# Patient Record
Sex: FEMALE | Race: WHITE | NOT HISPANIC OR LATINO | Employment: OTHER | URBAN - METROPOLITAN AREA
[De-identification: names, ages, dates, MRNs, and addresses within clinical notes are randomized per-mention and may not be internally consistent; named-entity substitution may affect disease eponyms.]

---

## 2019-03-12 ENCOUNTER — OFFICE VISIT (OUTPATIENT)
Dept: PHYSICAL MEDICINE AND REHAB | Facility: CLINIC | Age: 74
End: 2019-03-12
Payer: MEDICARE

## 2019-03-12 VITALS
DIASTOLIC BLOOD PRESSURE: 74 MMHG | WEIGHT: 212 LBS | SYSTOLIC BLOOD PRESSURE: 132 MMHG | RESPIRATION RATE: 18 BRPM | HEART RATE: 81 BPM | HEIGHT: 61 IN | BODY MASS INDEX: 40.02 KG/M2 | OXYGEN SATURATION: 96 % | TEMPERATURE: 97 F

## 2019-03-12 DIAGNOSIS — M47.16 LUMBAR SPONDYLOSIS WITH MYELOPATHY: Primary | ICD-10-CM

## 2019-03-12 DIAGNOSIS — M47.819 UNDIFFERENTIATED SPONDYLOARTHROPATHY: ICD-10-CM

## 2019-03-12 DIAGNOSIS — M54.5 CHRONIC MIDLINE LOW BACK PAIN, WITH SCIATICA PRESENCE UNSPECIFIED: ICD-10-CM

## 2019-03-12 DIAGNOSIS — M75.51 BILATERAL SHOULDER BURSITIS: ICD-10-CM

## 2019-03-12 DIAGNOSIS — J01.00 ACUTE NON-RECURRENT MAXILLARY SINUSITIS: ICD-10-CM

## 2019-03-12 DIAGNOSIS — G89.29 CHRONIC MIDLINE LOW BACK PAIN, WITH SCIATICA PRESENCE UNSPECIFIED: ICD-10-CM

## 2019-03-12 DIAGNOSIS — M54.12 CERVICAL RADICULOPATHY: ICD-10-CM

## 2019-03-12 DIAGNOSIS — M54.17 LUMBOSACRAL RADICULOPATHY: ICD-10-CM

## 2019-03-12 DIAGNOSIS — M75.52 BILATERAL SHOULDER BURSITIS: ICD-10-CM

## 2019-03-12 DIAGNOSIS — E66.01 CLASS 3 SEVERE OBESITY DUE TO EXCESS CALORIES WITH SERIOUS COMORBIDITY AND BODY MASS INDEX (BMI) OF 40.0 TO 44.9 IN ADULT: ICD-10-CM

## 2019-03-12 DIAGNOSIS — M70.61 TROCHANTERIC BURSITIS OF RIGHT HIP: ICD-10-CM

## 2019-03-12 PROBLEM — M47.9 SPONDYLOSIS: Status: ACTIVE | Noted: 2019-03-12

## 2019-03-12 PROBLEM — M81.0 OSTEOPOROSIS: Status: ACTIVE | Noted: 2019-03-12

## 2019-03-12 PROBLEM — H34.10 CENTRAL RETINAL ARTERY OCCLUSION: Status: ACTIVE | Noted: 2019-03-12

## 2019-03-12 PROBLEM — G56.00 CARPAL TUNNEL SYNDROME: Status: ACTIVE | Noted: 2019-03-12

## 2019-03-12 PROBLEM — I73.9 PERIPHERAL VASCULAR DISEASE: Status: ACTIVE | Noted: 2019-03-12

## 2019-03-12 PROBLEM — M31.6 GIANT CELL ARTERITIS: Status: ACTIVE | Noted: 2019-03-12

## 2019-03-12 PROBLEM — M35.00 SJOGREN'S SYNDROME: Status: ACTIVE | Noted: 2019-03-12

## 2019-03-12 PROBLEM — M79.7 FIBROSITIS: Status: ACTIVE | Noted: 2019-03-12

## 2019-03-12 PROBLEM — R94.4 ABNORMAL CREATININE CLEARANCE GLOMERULAR FILTRATION: Status: ACTIVE | Noted: 2018-12-20

## 2019-03-12 PROBLEM — J01.90 ACUTE SINUSITIS: Status: ACTIVE | Noted: 2019-03-12

## 2019-03-12 PROBLEM — K94.13 COLOSTOMY AND ENTEROSTOMY MALFUNCTION: Status: ACTIVE | Noted: 2019-03-12

## 2019-03-12 PROBLEM — M19.049 DEGENERATIVE JOINT DISEASE OF HAND: Status: ACTIVE | Noted: 2019-03-12

## 2019-03-12 PROBLEM — R53.81 MALAISE AND FATIGUE: Status: ACTIVE | Noted: 2019-03-12

## 2019-03-12 PROBLEM — M17.9 OSTEOARTHRITIS OF KNEE: Status: ACTIVE | Noted: 2019-03-12

## 2019-03-12 PROBLEM — E79.0 HYPERURICEMIA: Status: ACTIVE | Noted: 2019-03-12

## 2019-03-12 PROBLEM — N39.0 URINARY TRACT INFECTIOUS DISEASE: Status: ACTIVE | Noted: 2019-03-12

## 2019-03-12 PROBLEM — M75.50 BURSITIS OF SHOULDER: Status: ACTIVE | Noted: 2019-03-12

## 2019-03-12 PROBLEM — G43.909 MIGRAINE: Status: ACTIVE | Noted: 2019-03-12

## 2019-03-12 PROBLEM — R11.0 NAUSEA: Status: ACTIVE | Noted: 2019-03-12

## 2019-03-12 PROBLEM — R53.83 MALAISE AND FATIGUE: Status: ACTIVE | Noted: 2019-03-12

## 2019-03-12 PROBLEM — E78.2 MIXED HYPERLIPIDEMIA: Status: ACTIVE | Noted: 2019-03-12

## 2019-03-12 PROBLEM — B37.2 CANDIDIASIS OF SKIN AND NAILS: Status: ACTIVE | Noted: 2019-03-12

## 2019-03-12 PROBLEM — M62.81 MUSCLE WEAKNESS: Status: ACTIVE | Noted: 2019-03-12

## 2019-03-12 PROBLEM — M70.60 TROCHANTERIC BURSITIS: Status: ACTIVE | Noted: 2019-03-12

## 2019-03-12 PROBLEM — J30.9 ALLERGIC RHINITIS: Status: ACTIVE | Noted: 2019-03-12

## 2019-03-12 PROBLEM — R10.9 ABDOMINAL PAIN: Status: ACTIVE | Noted: 2019-03-12

## 2019-03-12 PROBLEM — K94.03 COLOSTOMY AND ENTEROSTOMY MALFUNCTION: Status: ACTIVE | Noted: 2019-03-12

## 2019-03-12 PROBLEM — F41.9 ANXIETY DISORDER: Status: ACTIVE | Noted: 2019-03-12

## 2019-03-12 PROBLEM — I60.7 RUPTURED CEREBRAL ANEURYSM: Status: ACTIVE | Noted: 2017-09-18

## 2019-03-12 PROBLEM — I10 BENIGN ESSENTIAL HYPERTENSION: Status: ACTIVE | Noted: 2019-03-12

## 2019-03-12 PROBLEM — D50.0 ANEMIA DUE TO BLOOD LOSS: Status: ACTIVE | Noted: 2019-03-12

## 2019-03-12 PROBLEM — R51.9 HEADACHE: Status: ACTIVE | Noted: 2019-03-12

## 2019-03-12 PROBLEM — K21.9 GASTROESOPHAGEAL REFLUX DISEASE: Status: ACTIVE | Noted: 2019-03-12

## 2019-03-12 PROCEDURE — 99214 OFFICE O/P EST MOD 30 MIN: CPT | Mod: S$GLB,,, | Performed by: NURSE PRACTITIONER

## 2019-03-12 PROCEDURE — 99214 PR OFFICE/OUTPT VISIT, EST, LEVL IV, 30-39 MIN: ICD-10-PCS | Mod: S$GLB,,, | Performed by: NURSE PRACTITIONER

## 2019-03-12 RX ORDER — DICLOFENAC SODIUM 10 MG/G
GEL TOPICAL 3 TIMES DAILY PRN
Qty: 100 G | Refills: 2 | Status: SHIPPED | OUTPATIENT
Start: 2019-03-12 | End: 2019-06-12

## 2019-03-12 RX ORDER — CYCLOBENZAPRINE HCL 10 MG
1 TABLET ORAL 3 TIMES DAILY PRN
COMMUNITY

## 2019-03-12 RX ORDER — PROMETHAZINE HYDROCHLORIDE 25 MG/1
1 TABLET ORAL 2 TIMES DAILY PRN
COMMUNITY

## 2019-03-12 RX ORDER — LINAGLIPTIN 5 MG/1
1 TABLET, FILM COATED ORAL DAILY
COMMUNITY
Start: 2019-01-26

## 2019-03-12 RX ORDER — HYDROCODONE BITARTRATE AND ACETAMINOPHEN 5; 325 MG/1; MG/1
1 TABLET ORAL 2 TIMES DAILY PRN
COMMUNITY

## 2019-03-12 RX ORDER — ATORVASTATIN CALCIUM 40 MG/1
1 TABLET, FILM COATED ORAL DAILY
COMMUNITY
Start: 2019-01-16

## 2019-03-12 RX ORDER — CYCLOSPORINE 0.5 MG/ML
1 EMULSION OPHTHALMIC 2 TIMES DAILY
COMMUNITY

## 2019-03-12 RX ORDER — OXAZEPAM 15 MG/1
1 CAPSULE ORAL 2 TIMES DAILY PRN
COMMUNITY
Start: 2019-01-29

## 2019-03-12 RX ORDER — FLUTICASONE PROPIONATE 50 MCG
1 SPRAY, SUSPENSION (ML) NASAL DAILY PRN
COMMUNITY

## 2019-03-12 RX ORDER — DICLOFENAC SODIUM 10 MG/G
1 GEL TOPICAL 3 TIMES DAILY PRN
COMMUNITY
End: 2019-03-12 | Stop reason: SDUPTHER

## 2019-03-12 RX ORDER — GLIPIZIDE 5 MG/1
1 TABLET ORAL 2 TIMES DAILY
COMMUNITY

## 2019-03-12 RX ORDER — PANTOPRAZOLE SODIUM 40 MG/1
1 TABLET, DELAYED RELEASE ORAL DAILY
COMMUNITY
Start: 2019-03-10

## 2019-03-12 RX ORDER — ALLOPURINOL 100 MG/1
1 TABLET ORAL 2 TIMES DAILY
COMMUNITY
End: 2019-04-30 | Stop reason: SDUPTHER

## 2019-03-12 RX ORDER — BUTALBITAL, ACETAMINOPHEN AND CAFFEINE 300; 40; 50 MG/1; MG/1; MG/1
2 CAPSULE ORAL 2 TIMES DAILY PRN
COMMUNITY

## 2019-03-12 RX ORDER — FUROSEMIDE 40 MG/1
1 TABLET ORAL DAILY
COMMUNITY
Start: 2019-01-16

## 2019-03-12 RX ORDER — LIDOCAINE 50 MG/G
1 PATCH TOPICAL 2 TIMES DAILY PRN
COMMUNITY

## 2019-03-12 RX ORDER — BISOPROLOL FUMARATE AND HYDROCHLOROTHIAZIDE 5; 6.25 MG/1; MG/1
1 TABLET ORAL DAILY
COMMUNITY

## 2019-03-12 RX ORDER — GABAPENTIN 300 MG/1
300 CAPSULE ORAL 3 TIMES DAILY
Qty: 90 CAPSULE | Refills: 2 | Status: SHIPPED | OUTPATIENT
Start: 2019-03-12

## 2019-03-12 RX ORDER — AMOXICILLIN AND CLAVULANATE POTASSIUM 875; 125 MG/1; MG/1
1 TABLET, FILM COATED ORAL 2 TIMES DAILY
Qty: 20 TABLET | Refills: 0 | Status: SHIPPED | OUTPATIENT
Start: 2019-03-12 | End: 2019-06-12 | Stop reason: SDUPTHER

## 2019-03-12 RX ORDER — PREDNISONE 10 MG/1
1 TABLET ORAL 2 TIMES DAILY
COMMUNITY
End: 2019-04-30 | Stop reason: SDUPTHER

## 2019-03-12 RX ORDER — GABAPENTIN 300 MG/1
1 CAPSULE ORAL 3 TIMES DAILY
COMMUNITY
Start: 2019-02-27 | End: 2019-03-12 | Stop reason: SDUPTHER

## 2019-03-12 RX ORDER — CLOPIDOGREL BISULFATE 75 MG/1
1 TABLET ORAL DAILY
COMMUNITY
Start: 2019-03-10

## 2019-03-12 NOTE — PROGRESS NOTES
Subjective:       Patient ID: Ramona Patiño is a 73 y.o. female.    Chief Complaint: Other (Chronic pain)    HPI     Joint & Soft Tissue Pain  Reported by patient.  Location: right (hip); deep; joint pain   Quality: aching; throbbing; deep; trend is not changing   Timing: frequent   Severity: moderate; pain level 6/10  Duration: 2 years   Context: bending; overuse   Alleviating Factors: position change; rest   Aggravating Factors: sitting; standing; ROM  Associated Symptoms: no swelling; no redness; no warmth; no ecchymosis; no catching/locking; no buckling; no grinding; weakness; numbness; tingling; popping/clicking    Lower Back  Reported by patient.  Location: bilateral; midline   Quality: aching; throbbing; sharp; deep   Severity: moderate; pain level 7/10   Duration: date of onset: (2013)   Timing: chronic; recurrent   Context: overuse   Alleviating Factors: rest   Aggravating Factors: sitting; standing; lying down; ROM; weightbearing; exercise   Associated Symptoms: no weakness; no numbness; no tingling; no swelling; no redness; no warmth; no ecchymosis; no catching/locking; no grinding; no instability; radiation down leg (intermittent)   Previous Surgery: none   Prior Imaging: x ray; MRI   Previous Injections: none   Previous PT: helped a little   Work Related: no   Working: no    Neck  Reported by patient.  Location: midline; posterior   Quality: throbbing; dull   Severity: moderate; pain level 6/10   Duration: date of onset: (2013); days; months; years   Timing: chronic; recurrent   Context: overuse   Alleviating Factors: nothing helps   Aggravating Factors: ROM   Associated Symptoms: no weakness; no numbness; no tingling; no swelling; no redness; no warmth; no ecchymosis; no catching/locking; no popping/clicking; no buckling; no grinding   Previous Surgery: none   Prior Imaging: x ray; CT scan   Previous Injections: none   Previous PT: helped a little   Work Related: no   Working: no  She has PMH HTN,  HLD, degenerative joint disease, chronic migraines, central retinal artery occlusion, PVD, GCA, osteoporosis, anemia, and Sjogren's sx. Imaging validates spondylitis, cervical radiculopathy, trochanteric bursistis, and lumbosacral radiculopathy. She also has carpal tunnel paresthesia. Today she c/o chronic lower back pain, right hip, and neck pain. Physical therapy in 2013, but she states PT was ineffective. No instability issues in right hip. No grinding, popping, clicking, or locking.    Additionally, she c/o congestion, headache, sore throat symptoms for apprx 1-2 weeks. Not getting any better despite conservative treatment w/ OTC medications.       Review of Systems   Constitutional: Negative for activity change, appetite change, chills and fever.   HENT: Positive for congestion, postnasal drip, rhinorrhea, sinus pressure, sinus pain and sore throat. Negative for hearing loss, nosebleeds and trouble swallowing.    Respiratory: Negative for cough, chest tightness, shortness of breath and wheezing.    Cardiovascular: Negative for chest pain and palpitations.   Gastrointestinal: Negative for abdominal distention, abdominal pain, constipation, diarrhea, nausea and vomiting.   Genitourinary: Negative for difficulty urinating, flank pain, frequency, hematuria and urgency.   Musculoskeletal: Positive for arthralgias, back pain, gait problem, myalgias and neck pain. Negative for joint swelling and neck stiffness.   Skin: Negative for color change and pallor.   Neurological: Positive for numbness. Negative for dizziness, seizures, syncope, weakness, light-headedness and headaches.   Hematological: Negative for adenopathy. Does not bruise/bleed easily.   Psychiatric/Behavioral: Negative for agitation, behavioral problems, confusion and sleep disturbance. The patient is not nervous/anxious.        Objective:      Physical Exam   Constitutional: She is oriented to person, place, and time. She appears well-developed and  well-nourished.   HENT:   Head: Normocephalic and atraumatic.   Nose: Mucosal edema, rhinorrhea and sinus tenderness present. Right sinus exhibits maxillary sinus tenderness. Right sinus exhibits no frontal sinus tenderness. Left sinus exhibits maxillary sinus tenderness. Left sinus exhibits no frontal sinus tenderness.   Mouth/Throat: Mucous membranes are normal. Posterior oropharyngeal edema and posterior oropharyngeal erythema present. No oropharyngeal exudate or tonsillar abscesses.   Eyes: Conjunctivae and EOM are normal. Pupils are equal, round, and reactive to light. No scleral icterus.   Neck: Trachea normal and normal range of motion. Neck supple. Carotid bruit is not present. No thyroid mass present.   Cardiovascular: Normal rate, regular rhythm and normal heart sounds.   Pulmonary/Chest: Effort normal and breath sounds normal. No apnea and no tachypnea. No respiratory distress.   Abdominal: Soft. Bowel sounds are normal.   Musculoskeletal: Normal range of motion. She exhibits tenderness. She exhibits no edema.   Diffuse lumbar tenderness     Neurological: She is alert and oriented to person, place, and time.   Skin: Skin is warm and dry.   Psychiatric: She has a normal mood and affect. Her behavior is normal. Judgment normal.       Assessment:       1. Lumbar spondylosis with myelopathy    2. Lumbosacral radiculopathy    3. Undifferentiated spondyloarthropathy    4. Cervical radiculopathy    5. Bilateral shoulder bursitis    6. Trochanteric bursitis of right hip    7. Chronic midline low back pain, with sciatica presence unspecified    8. Class 3 severe obesity due to excess calories with serious comorbidity and body mass index (BMI) of 40.0 to 44.9 in adult    9. Acute non-recurrent maxillary sinusitis        Plan:         pain well controlled w/ current regimen. Will continue her multimodal therapy of opioid w/ cymbalta and diclofenac gel     Additionally 2 week duration of acute sinusitis symptoms, not  responding to OTC sinus/ allergy meds. Will treat. Instructed in increase oral hydration of H2O. Instructed to avoid any over the counter cough/congestion products that may contain phenylephrine or pseudoephedrine which may increase her blood pressure.

## 2019-03-12 NOTE — PATIENT INSTRUCTIONS
Avoid any over the counter cough/congestion products that may contain phenylephrine or pseudoephedrine which may increase your blood pressure. Increase intake of water. Take all medications as prescribed.

## 2019-04-16 DIAGNOSIS — J01.00 ACUTE NON-RECURRENT MAXILLARY SINUSITIS: ICD-10-CM

## 2019-04-17 RX ORDER — AMOXICILLIN AND CLAVULANATE POTASSIUM 875; 125 MG/1; MG/1
TABLET, FILM COATED ORAL
Qty: 20 TABLET | Refills: 0 | OUTPATIENT
Start: 2019-04-17

## 2019-04-26 LAB
ABS NRBC COUNT: 0 X 10 3/UL (ref 0–0.01)
ABSOLUTE BASOPHIL: 0.04 X 10 3/UL (ref 0–0.22)
ABSOLUTE EOSINOPHIL: 0.07 X 10 3/UL (ref 0.04–0.54)
ABSOLUTE IMMATURE GRAN: 0.18 X 10 3/UL (ref 0–0.04)
ABSOLUTE LYMPHOCYTE: 1.64 X 10 3/UL (ref 0.86–4.75)
ABSOLUTE MONOCYTE: 0.86 X 10 3/UL (ref 0.22–1.08)
ALBUMIN SERPL-MCNC: 4 G/DL (ref 3.5–5.2)
ALBUMIN/GLOB SERPL ELPH: 1.4 {RATIO} (ref 1–2.7)
ALP ISOS SERPL LEV INH-CCNC: 148 IU/L (ref 35–105)
ALT (SGPT): 33 U/L (ref 0–33)
AMORPH URATE CRY URNS QL MICRO: NEGATIVE
ANION GAP SERPL CALC-SCNC: 14 MMOL/L (ref 8–17)
AST SERPL-CCNC: 44 U/L (ref 0–32)
BACTERIA #/AREA URNS HPF: ABNORMAL /[HPF]
BASOPHILS NFR BLD: 0.3 %
BILIRUB UR QL STRIP: NEGATIVE
BILIRUBIN, TOTAL: 0.3 MG/DL (ref 0–1.2)
BUN/CREAT SERPL: 22.1 (ref 6–20)
CALCIUM SERPL-MCNC: 10.3 MG/DL (ref 8.6–10.2)
CARBON DIOXIDE, CO2: 29 MMOL/L (ref 22–29)
CHLORIDE: 97 MMOL/L (ref 98–107)
CLARITY UR: ABNORMAL
COLOR UR: YELLOW
CREAT SERPL-MCNC: 1.11 MG/DL (ref 0.5–0.9)
EOSINOPHIL NFR BLD: 0.5 %
EPITHELIAL CELLS: ABNORMAL
GFR ESTIMATION: 47.79
GLOBULIN: 2.8 G/DL (ref 1.5–4.5)
GLUCOSE (UA): NEGATIVE MG/DL
GLUCOSE: 206 MG/DL (ref 82–115)
HCT VFR BLD AUTO: 34 % (ref 37–47)
HGB BLD-MCNC: 9.8 G/DL (ref 12–16)
IMMATURE GRANULOCYTES: 1.2 % (ref 0–0.5)
KETONES UR QL STRIP: NEGATIVE MG/DL
LEUKOCYTE ESTERASE UR QL STRIP: ABNORMAL
LYMPHOCYTES NFR BLD: 11.4 %
MCH RBC QN AUTO: 27.5 PG (ref 27–32)
MCHC RBC AUTO-ENTMCNC: 28.8 G/DL (ref 32–36)
MCV RBC AUTO: 95.2 FL (ref 82–100)
MONOCYTES NFR BLD: 6 %
MUCOUS THREADS URNS QL MICRO: NEGATIVE
NEUTROPHILS ABSOLUTE COUNT: 11.65 X 10 3/UL (ref 2.15–7.56)
NEUTROPHILS NFR BLD: 80.6 %
NITRITE UR QL STRIP: NEGATIVE
NUCLEATED RED BLOOD CELLS: 0 /100 WBC (ref 0–0.2)
OCCULT BLOOD: ABNORMAL
PH, URINE: 7 (ref 5–7.5)
PLATELET # BLD AUTO: 303 X 10 3/UL (ref 135–400)
POTASSIUM: 4.9 MMOL/L (ref 3.5–5.1)
PROT SNV-MCNC: 6.8 G/DL (ref 6.4–8.3)
PROT UR QL STRIP: NEGATIVE MG/DL
RBC # BLD AUTO: 3.57 X 10 6/UL (ref 4.2–5.4)
RBC/HPF: ABNORMAL
RDW-SD: 56.4 FL (ref 37–54)
SED RATE (WESTERGREN): 45 MM/HR (ref 0–30)
SODIUM: 140 MMOL/L (ref 136–145)
SP GR UR STRIP: 1 (ref 1–1.03)
UREA NITROGEN (BUN): 24.5 MG/DL (ref 8–23)
UROBILINOGEN, URINE: NORMAL E.U./DL (ref 0–1)
WBC # BLD: 14.44 X 10 3/UL (ref 4.3–10.8)
WBC/HPF: ABNORMAL

## 2019-04-30 ENCOUNTER — OFFICE VISIT (OUTPATIENT)
Dept: RHEUMATOLOGY | Facility: CLINIC | Age: 74
End: 2019-04-30
Payer: MEDICARE

## 2019-04-30 VITALS
HEART RATE: 82 BPM | HEIGHT: 61 IN | TEMPERATURE: 97 F | RESPIRATION RATE: 16 BRPM | SYSTOLIC BLOOD PRESSURE: 140 MMHG | BODY MASS INDEX: 39.46 KG/M2 | DIASTOLIC BLOOD PRESSURE: 70 MMHG | WEIGHT: 209 LBS

## 2019-04-30 DIAGNOSIS — M19.041 PRIMARY OSTEOARTHRITIS OF RIGHT HAND: ICD-10-CM

## 2019-04-30 DIAGNOSIS — M79.7 FIBROSITIS: ICD-10-CM

## 2019-04-30 DIAGNOSIS — M35.00 SJOGREN'S SYNDROME WITHOUT EXTRAGLANDULAR INVOLVEMENT: ICD-10-CM

## 2019-04-30 DIAGNOSIS — M10.9 GOUT, UNSPECIFIED CAUSE, UNSPECIFIED CHRONICITY, UNSPECIFIED SITE: Primary | ICD-10-CM

## 2019-04-30 DIAGNOSIS — R94.4 ABNORMAL CREATININE CLEARANCE GLOMERULAR FILTRATION: ICD-10-CM

## 2019-04-30 DIAGNOSIS — M31.6 GIANT CELL ARTERITIS: ICD-10-CM

## 2019-04-30 DIAGNOSIS — M54.12 CERVICAL RADICULOPATHY: ICD-10-CM

## 2019-04-30 DIAGNOSIS — E79.0 HYPERURICEMIA: ICD-10-CM

## 2019-04-30 DIAGNOSIS — M06.9 RHEUMATOID ARTHRITIS, INVOLVING UNSPECIFIED SITE, UNSPECIFIED RHEUMATOID FACTOR PRESENCE: ICD-10-CM

## 2019-04-30 DIAGNOSIS — I73.9 PERIPHERAL VASCULAR DISEASE: ICD-10-CM

## 2019-04-30 DIAGNOSIS — H34.10 CENTRAL RETINAL ARTERY OCCLUSION, UNSPECIFIED LATERALITY: ICD-10-CM

## 2019-04-30 DIAGNOSIS — G56.03 BILATERAL CARPAL TUNNEL SYNDROME: ICD-10-CM

## 2019-04-30 DIAGNOSIS — M75.50 BURSITIS OF SHOULDER, UNSPECIFIED LATERALITY: ICD-10-CM

## 2019-04-30 PROCEDURE — 99214 PR OFFICE/OUTPT VISIT, EST, LEVL IV, 30-39 MIN: ICD-10-PCS | Mod: S$GLB,,, | Performed by: INTERNAL MEDICINE

## 2019-04-30 PROCEDURE — 99214 OFFICE O/P EST MOD 30 MIN: CPT | Mod: S$GLB,,, | Performed by: INTERNAL MEDICINE

## 2019-04-30 RX ORDER — PREDNISONE 10 MG/1
10 TABLET ORAL 2 TIMES DAILY
Qty: 180 TABLET | Refills: 3 | Status: SHIPPED | OUTPATIENT
Start: 2019-04-30 | End: 2019-06-12 | Stop reason: SDUPTHER

## 2019-04-30 RX ORDER — ALLOPURINOL 100 MG/1
100 TABLET ORAL 2 TIMES DAILY
Qty: 180 TABLET | Refills: 3 | Status: SHIPPED | OUTPATIENT
Start: 2019-04-30 | End: 2019-07-30 | Stop reason: SDUPTHER

## 2019-04-30 NOTE — PROGRESS NOTES
Subjective:       Patient ID: Ramona Patiño is a 74 y.o. female.    Chief Complaint: Follow-up    HPI  Has problems with difficulty walking  With back giving out with low back  With sciatic pain at times taking Gabapentin 300 mg tjhree a dy and predniso 10 mg BID.no sudden loss of vision And has temple tenderness and blurred vision  Seen eye Dr Crow . Has th walker to help with walking.  No acute gouty arthriotis  On allopurinol 200mg aday. Drynessin eye on Restawis plus artificial tear drops and sip more water. Lidoderm patches-  Help with lower back. On OC iron and proton ix.      Current medications include:  Current Outpatient Medications on File Prior to Visit   Medication Sig Dispense Refill    allopurinol (ZYLOPRIM) 100 MG tablet Take 1 tablet by mouth 2 (two) times daily.      amoxicillin-clavulanate 875-125mg (AUGMENTIN) 875-125 mg per tablet Take 1 tablet by mouth 2 (two) times daily. 20 tablet 0    atorvastatin (LIPITOR) 40 MG tablet Take 1 tablet by mouth once daily.      bisoprolol-hydrochlorothiazide 5-6.25 mg (ZIAC) 5-6.25 mg Tab Take 1 tablet by mouth once daily.      butalbital-acetaminophen-caff -40 mg Cap Take 2 tablets by mouth 2 (two) times daily as needed.      clopidogrel (PLAVIX) 75 mg tablet Take 1 tablet by mouth once daily.      CYANOCOBALAMIN, VITAMIN B-12, INJ Inject 1 mL into the muscle every 30 days.      cyclobenzaprine (FLEXERIL) 10 MG tablet Take 1 tablet by mouth 3 (three) times daily as needed.      cycloSPORINE (RESTASIS) 0.05 % ophthalmic emulsion Place 1 drop into both eyes 2 (two) times daily.      diclofenac sodium (VOLTAREN) 1 % Gel Apply topically 3 (three) times daily as needed. 100 g 2    fluticasone (FLONASE) 50 mcg/actuation nasal spray 1 spray by Each Nare route daily as needed.      furosemide (LASIX) 40 MG tablet Take 1 tablet by mouth once daily.      gabapentin (NEURONTIN) 300 MG capsule Take 1 capsule (300 mg total) by mouth 3 (three) times  "daily. 90 capsule 2    glipiZIDE (GLUCOTROL) 5 MG tablet Take 1 tablet by mouth 2 (two) times daily.      HYDROcodone-acetaminophen (NORCO) 5-325 mg per tablet Take 1 tablet by mouth 2 (two) times daily as needed.      lidocaine (LIDODERM) 5 % Place 1 patch onto the skin 2 (two) times daily as needed.      oxazepam (SERAX) 15 MG capsule Take 1 capsule by mouth 2 (two) times daily as needed.      pantoprazole (PROTONIX) 40 MG tablet Take 1 tablet by mouth once daily.      predniSONE (DELTASONE) 10 MG tablet Take 1 tablet by mouth 2 (two) times daily.      promethazine (PHENERGAN) 25 MG tablet Take 1 tablet by mouth 2 (two) times daily as needed.      TRADJENTA 5 mg Tab tablet Take 1 tablet by mouth once daily.       No current facility-administered medications on file prior to visit.        Lab Results:  No results found for: WBC, RBC, HGB, HCT, MCV, COLORU, SPECGRAV, PHUR, WBCUR, NITRITE, GLUCOSEUR, KETONESU, BILIRUBINUR, RBCUR, NA, K, CL, CO2, GLU, BUN, CREATININE     Review of Systems   Constitutional: Negative.    HENT:        Druyness in eyes on Restasis and ATD . Sips more water for mouth   Respiratory: Positive for cough.         Cough and has allergy to pollen   Gastrointestinal: Negative.    Endocrine:        Diabetes on glipixide   Genitourinary: Negative.    Musculoskeletal: Positive for arthralgias and back pain.   Allergic/Immunologic: Positive for environmental allergies.   Neurological: Negative.    Hematological: Positive for adenopathy. Bruises/bleeds easily.        Neck ladenopathy with allergy flare ups. Hx of anemia   Psychiatric/Behavioral: Negative.          Objective:   BP (!) 140/70 (BP Location: Right arm, Patient Position: Sitting, BP Method: Large (Manual))   Pulse 82   Temp 96.6 °F (35.9 °C) (Temporal)   Resp 16   Ht 5' 1" (1.549 m)   Wt 94.8 kg (209 lb)   BMI 39.49 kg/m²      Physical Exam   Constitutional:   Central obesity   HENT:   Dryness in eyes and mouth . Rt temple " are mild temporal arty tenderness.   Eyes: Conjunctivae are normal. Pupils are equal, round, and reactive to light.   Neck: Normal range of motion. Neck supple.   Neck spine mil tenderness   Cardiovascular: Normal rate.    Murmur heard.  Pulmonary/Chest: Effort normal.   Abdominal: Soft. Bowel sounds are normal.   Neurological:   SLR= positive.   Skin: Skin is warm and dry.     Musculoskeletal:   Lower  spinous processes.. Hip greater trochanter bursae tenderness           Assessment:       1. Gout, unspecified cause, unspecified chronicity, unspecified site    2. Rheumatoid arthritis, involving unspecified site, unspecified rheumatoid factor presence    3. Giant cell arteritis    4. Sjogren's syndrome without extraglandular involvement    5. Central retinal artery occlusion, unspecified laterality    6. Bilateral carpal tunnel syndrome    7. Peripheral vascular disease    8. Primary osteoarthritis of right hand    9. Bursitis of shoulder, unspecified laterality    10. Fibrositis    11. Hyperuricemia    12. Cervical radiculopathy    13. Abnormal creatinine clearance glomerular filtration            Plan:       Has a walker to [facility] ambulation co\[facility] current medication

## 2019-06-12 ENCOUNTER — OFFICE VISIT (OUTPATIENT)
Dept: PHYSICAL MEDICINE AND REHAB | Facility: CLINIC | Age: 74
End: 2019-06-12
Payer: MEDICARE

## 2019-06-12 VITALS
WEIGHT: 208.63 LBS | OXYGEN SATURATION: 95 % | TEMPERATURE: 97 F | BODY MASS INDEX: 39.39 KG/M2 | HEART RATE: 72 BPM | SYSTOLIC BLOOD PRESSURE: 118 MMHG | DIASTOLIC BLOOD PRESSURE: 76 MMHG | RESPIRATION RATE: 18 BRPM | HEIGHT: 61 IN

## 2019-06-12 DIAGNOSIS — E66.01 CLASS 3 SEVERE OBESITY DUE TO EXCESS CALORIES WITH SERIOUS COMORBIDITY AND BODY MASS INDEX (BMI) OF 40.0 TO 44.9 IN ADULT: ICD-10-CM

## 2019-06-12 DIAGNOSIS — M47.819 UNDIFFERENTIATED SPONDYLOARTHROPATHY: ICD-10-CM

## 2019-06-12 DIAGNOSIS — M75.52 BILATERAL SHOULDER BURSITIS: ICD-10-CM

## 2019-06-12 DIAGNOSIS — M54.17 LUMBOSACRAL RADICULOPATHY: ICD-10-CM

## 2019-06-12 DIAGNOSIS — M75.51 BILATERAL SHOULDER BURSITIS: ICD-10-CM

## 2019-06-12 DIAGNOSIS — M47.16 LUMBAR SPONDYLOSIS WITH MYELOPATHY: Primary | ICD-10-CM

## 2019-06-12 DIAGNOSIS — M54.12 CERVICAL RADICULOPATHY: ICD-10-CM

## 2019-06-12 DIAGNOSIS — G89.29 CHRONIC MIDLINE LOW BACK PAIN, WITH SCIATICA PRESENCE UNSPECIFIED: ICD-10-CM

## 2019-06-12 DIAGNOSIS — M70.61 TROCHANTERIC BURSITIS OF RIGHT HIP: ICD-10-CM

## 2019-06-12 DIAGNOSIS — M54.5 CHRONIC MIDLINE LOW BACK PAIN, WITH SCIATICA PRESENCE UNSPECIFIED: ICD-10-CM

## 2019-06-12 PROCEDURE — 99213 PR OFFICE/OUTPT VISIT, EST, LEVL III, 20-29 MIN: ICD-10-PCS | Mod: S$GLB,,, | Performed by: NURSE PRACTITIONER

## 2019-06-12 PROCEDURE — 99213 OFFICE O/P EST LOW 20 MIN: CPT | Mod: S$GLB,,, | Performed by: NURSE PRACTITIONER

## 2019-06-12 RX ORDER — DICLOFENAC SODIUM 10 MG/G
GEL TOPICAL 4 TIMES DAILY
Qty: 100 G | Refills: 2 | Status: SHIPPED | OUTPATIENT
Start: 2019-06-12 | End: 2019-09-23 | Stop reason: SDUPTHER

## 2019-06-12 RX ORDER — PREDNISONE 10 MG/1
30 TABLET ORAL DAILY
COMMUNITY

## 2019-06-12 NOTE — PROGRESS NOTES
Subjective:       Patient ID: Ramona Patiño is a 74 y.o. female.    Chief Complaint: Follow-up (Rehab-Med Refills. )    HPI       Joint & Soft Tissue Pain  Reported by patient.    Location: right (hip); deep; joint pain   Quality: aching; throbbing; deep; trend is not changing   Timing: frequent   Severity: moderate; pain level 6/10  Duration: 2 years   Context: bending; overuse   Alleviating Factors: position change; rest   Aggravating Factors: sitting; standing; ROM  Associated Symptoms: no swelling; no redness; no warmth; no ecchymosis; no catching/locking; no buckling; no grinding; weakness; numbness; tingling; popping/clicking    Lower Back  Reported by patient.    Location: bilateral; midline   Quality: aching; throbbing; sharp; deep   Severity: moderate; pain level 7/10   Duration: date of onset: (2013)   Timing: chronic; recurrent   Context: overuse   Alleviating Factors: rest   Aggravating Factors: sitting; standing; lying down; ROM; weightbearing; exercise   Associated Symptoms: no weakness; no numbness; no tingling; no swelling; no redness; no warmth; no ecchymosis; no catching/locking; no grinding; no instability; radiation down leg (intermittent)   Previous Surgery: none   Prior Imaging: x ray; MRI   Previous Injections: none   Previous PT: helped a little   Work Related: no   Working: no    Neck  Reported by patient.    Location: midline; posterior   Quality: throbbing; dull   Severity: moderate; pain level 6/10   Duration: date of onset: (2013); days; months; years   Timing: chronic; recurrent   Context: overuse   Alleviating Factors: nothing helps   Aggravating Factors: ROM   Associated Symptoms: no weakness; no numbness; no tingling; no swelling; no redness; no warmth; no ecchymosis; no catching/locking; no popping/clicking; no buckling; no grinding   Previous Surgery: none   Prior Imaging: x ray; CT scan   Previous Injections: none   Previous PT: helped a little   Work Related: no   Working:  no  She has PMH HTN, HLD, degenerative joint disease, chronic migraines, central retinal artery occlusion, PVD, GCA, osteoporosis, anemia, and Sjogren's sx. Imaging validates spondylitis, cervical radiculopathy, trochanteric bursistis, and lumbosacral radiculopathy. She also has carpal tunnel paresthesia. Today she c/o chronic lower back pain, right hip, and neck pain. Physical therapy in 2013, but she states PT was ineffective. No instability issues in right hip. No grinding, popping, clicking, or locking.    Additionally, she c/o congestion, headache, sore throat symptoms for apprx 1-2 weeks. Not getting any better despite conservative treatment w/ OTC medications.       Review of Systems   Constitutional: Negative for activity change, appetite change, chills and fever.   HENT: Positive for congestion, postnasal drip, rhinorrhea, sinus pressure, sinus pain and sore throat. Negative for hearing loss, nosebleeds and trouble swallowing.    Respiratory: Negative for cough, chest tightness, shortness of breath and wheezing.    Cardiovascular: Negative for chest pain and palpitations.   Gastrointestinal: Negative for abdominal distention, abdominal pain, constipation, diarrhea, nausea and vomiting.   Genitourinary: Negative for difficulty urinating, flank pain, frequency, hematuria and urgency.   Musculoskeletal: Positive for arthralgias, back pain, gait problem, myalgias and neck pain. Negative for joint swelling and neck stiffness.   Skin: Negative for color change and pallor.   Neurological: Positive for numbness. Negative for dizziness, seizures, syncope, weakness, light-headedness and headaches.   Hematological: Negative for adenopathy. Does not bruise/bleed easily.   Psychiatric/Behavioral: Negative for agitation, behavioral problems, confusion and sleep disturbance. The patient is not nervous/anxious.        Objective:      Physical Exam   Constitutional: She is oriented to person, place, and time. She appears  well-developed and well-nourished.   HENT:   Head: Normocephalic and atraumatic.   Nose: Mucosal edema, rhinorrhea and sinus tenderness present. Right sinus exhibits maxillary sinus tenderness. Right sinus exhibits no frontal sinus tenderness. Left sinus exhibits maxillary sinus tenderness. Left sinus exhibits no frontal sinus tenderness.   Mouth/Throat: Mucous membranes are normal. Posterior oropharyngeal edema and posterior oropharyngeal erythema present. No oropharyngeal exudate or tonsillar abscesses.   Eyes: Pupils are equal, round, and reactive to light. Conjunctivae and EOM are normal. No scleral icterus.   Neck: Trachea normal and normal range of motion. Neck supple. Carotid bruit is not present. No thyroid mass present.   Cardiovascular: Normal rate, regular rhythm and normal heart sounds.   Pulmonary/Chest: Effort normal and breath sounds normal. No apnea and no tachypnea. No respiratory distress.   Abdominal: Soft. Bowel sounds are normal.   Musculoskeletal: Normal range of motion. She exhibits tenderness. She exhibits no edema.   Diffuse lumbar tenderness     Neurological: She is alert and oriented to person, place, and time.   Skin: Skin is warm and dry.   Psychiatric: She has a normal mood and affect. Her behavior is normal. Judgment normal.       Assessment:       1. Lumbar spondylosis with myelopathy    2. Lumbosacral radiculopathy    3. Undifferentiated spondyloarthropathy    4. Cervical radiculopathy    5. Bilateral shoulder bursitis    6. Trochanteric bursitis of right hip    7. Chronic midline low back pain, with sciatica presence unspecified    8. Class 3 severe obesity due to excess calories with serious comorbidity and body mass index (BMI) of 40.0 to 44.9 in adult        Plan:         pain well controlled w/ current regimen. Will continue her multimodal therapy of opioid w/ cymbalta and diclofenac gel

## 2019-06-17 PROBLEM — J01.90 ACUTE SINUSITIS: Status: RESOLVED | Noted: 2019-03-12 | Resolved: 2019-06-17

## 2019-07-23 LAB — URATE SERPL-MCNC: 5.8 MG/DL (ref 2.4–5.7)

## 2019-07-25 LAB
ABS NRBC COUNT: 0 X 10 3/UL (ref 0–0.01)
ABSOLUTE BASOPHIL: 0.02 X 10 3/UL (ref 0–0.22)
ABSOLUTE EOSINOPHIL: 0.03 X 10 3/UL (ref 0.04–0.54)
ABSOLUTE IMMATURE GRAN: 0.22 X 10 3/UL (ref 0–0.04)
ABSOLUTE LYMPHOCYTE: 1.48 X 10 3/UL (ref 0.86–4.75)
ABSOLUTE MONOCYTE: 0.84 X 10 3/UL (ref 0.22–1.08)
ALBUMIN SERPL-MCNC: 4 G/DL (ref 3.5–5.2)
ALBUMIN/GLOB SERPL ELPH: 1.7 {RATIO} (ref 1–2.7)
ALP ISOS SERPL LEV INH-CCNC: 128 IU/L (ref 35–105)
ALT (SGPT): 20 U/L (ref 0–33)
AMORPH URATE CRY URNS QL MICRO: NEGATIVE
ANION GAP SERPL CALC-SCNC: 14 MMOL/L (ref 8–17)
AST SERPL-CCNC: 23 U/L (ref 0–32)
BACTERIA #/AREA URNS HPF: ABNORMAL /[HPF]
BASOPHILS NFR BLD: 0.1 %
BILIRUB UR QL STRIP: NEGATIVE
BILIRUBIN, TOTAL: 0.24 MG/DL (ref 0–1.2)
BUN/CREAT SERPL: 16.7 (ref 6–20)
CALCIUM SERPL-MCNC: 9.5 MG/DL (ref 8.6–10.2)
CARBON DIOXIDE, CO2: 30 MMOL/L (ref 22–29)
CHLORIDE: 97 MMOL/L (ref 98–107)
CLARITY UR: ABNORMAL
COLOR UR: YELLOW
CREAT SERPL-MCNC: 1.05 MG/DL (ref 0.5–0.9)
EOSINOPHIL NFR BLD: 0.2 %
EPITHELIAL CELLS: ABNORMAL
GFR ESTIMATION: 50.95
GLOBULIN: 2.4 G/DL (ref 1.5–4.5)
GLUCOSE (UA): NEGATIVE MG/DL
GLUCOSE: 177 MG/DL (ref 82–115)
HCT VFR BLD AUTO: 30.4 % (ref 37–47)
HGB BLD-MCNC: 9 G/DL (ref 12–16)
IMMATURE GRANULOCYTES: 1.5 % (ref 0–0.5)
KETONES UR QL STRIP: NEGATIVE MG/DL
LEUKOCYTE ESTERASE UR QL STRIP: ABNORMAL
LYMPHOCYTES NFR BLD: 10.4 %
MCH RBC QN AUTO: 26.6 PG (ref 27–32)
MCHC RBC AUTO-ENTMCNC: 29.6 G/DL (ref 32–36)
MCV RBC AUTO: 89.9 FL (ref 82–100)
MONOCYTES NFR BLD: 5.9 %
MUCOUS THREADS URNS QL MICRO: NEGATIVE
NEUTROPHILS ABSOLUTE COUNT: 11.62 X 10 3/UL (ref 2.15–7.56)
NEUTROPHILS NFR BLD: 81.9 %
NITRITE UR QL STRIP: POSITIVE
NUCLEATED RED BLOOD CELLS: 0 /100 WBC (ref 0–0.2)
OCCULT BLOOD: NEGATIVE
OTHER CRYSTALS: ABNORMAL
PH, URINE: 7 (ref 5–7.5)
PLATELET # BLD AUTO: 268 X 10 3/UL (ref 135–400)
POTASSIUM: 4.6 MMOL/L (ref 3.5–5.1)
PROT SNV-MCNC: 6.4 G/DL (ref 6.4–8.3)
PROT UR QL STRIP: NEGATIVE MG/DL
RBC # BLD AUTO: 3.38 X 10 6/UL (ref 4.2–5.4)
RBC/HPF: NEGATIVE
RDW-SD: 57.1 FL (ref 37–54)
SED RATE (WESTERGREN): 23 MM/HR (ref 0–30)
SODIUM: 141 MMOL/L (ref 136–145)
SP GR UR STRIP: 1 (ref 1–1.03)
UREA NITROGEN (BUN): 17.5 MG/DL (ref 8–23)
URINE CULTURE, ROUTINE: NORMAL
UROBILINOGEN, URINE: NORMAL E.U./DL (ref 0–1)
WBC # BLD: 14.21 X 10 3/UL (ref 4.3–10.8)
WBC/HPF: ABNORMAL

## 2019-07-30 ENCOUNTER — OFFICE VISIT (OUTPATIENT)
Dept: RHEUMATOLOGY | Facility: CLINIC | Age: 74
End: 2019-07-30
Payer: MEDICARE

## 2019-07-30 VITALS
SYSTOLIC BLOOD PRESSURE: 110 MMHG | TEMPERATURE: 97 F | BODY MASS INDEX: 38.71 KG/M2 | DIASTOLIC BLOOD PRESSURE: 80 MMHG | HEIGHT: 61 IN | WEIGHT: 205 LBS | HEART RATE: 85 BPM | RESPIRATION RATE: 16 BRPM

## 2019-07-30 DIAGNOSIS — M54.12 CERVICAL RADICULOPATHY: ICD-10-CM

## 2019-07-30 DIAGNOSIS — M70.62 TROCHANTERIC BURSITIS OF BOTH HIPS: ICD-10-CM

## 2019-07-30 DIAGNOSIS — M70.61 TROCHANTERIC BURSITIS OF BOTH HIPS: ICD-10-CM

## 2019-07-30 DIAGNOSIS — M31.6 GIANT CELL ARTERITIS: ICD-10-CM

## 2019-07-30 DIAGNOSIS — M17.10 PRIMARY OSTEOARTHRITIS OF KNEE, UNSPECIFIED LATERALITY: ICD-10-CM

## 2019-07-30 DIAGNOSIS — E79.0 HYPERURICEMIA: ICD-10-CM

## 2019-07-30 DIAGNOSIS — M75.50 BURSITIS OF SHOULDER, UNSPECIFIED LATERALITY: ICD-10-CM

## 2019-07-30 DIAGNOSIS — M35.00 SJOGREN'S SYNDROME WITHOUT EXTRAGLANDULAR INVOLVEMENT: Primary | ICD-10-CM

## 2019-07-30 DIAGNOSIS — M79.7 FIBROSITIS: ICD-10-CM

## 2019-07-30 DIAGNOSIS — H34.10 CENTRAL RETINAL ARTERY OCCLUSION, UNSPECIFIED LATERALITY: ICD-10-CM

## 2019-07-30 DIAGNOSIS — D50.0 ANEMIA DUE TO BLOOD LOSS: ICD-10-CM

## 2019-07-30 DIAGNOSIS — M54.17 LUMBOSACRAL RADICULOPATHY: ICD-10-CM

## 2019-07-30 DIAGNOSIS — M10.9 GOUT, UNSPECIFIED CAUSE, UNSPECIFIED CHRONICITY, UNSPECIFIED SITE: ICD-10-CM

## 2019-07-30 DIAGNOSIS — G56.03 BILATERAL CARPAL TUNNEL SYNDROME: ICD-10-CM

## 2019-07-30 DIAGNOSIS — R94.4 ABNORMAL CREATININE CLEARANCE GLOMERULAR FILTRATION: ICD-10-CM

## 2019-07-30 DIAGNOSIS — M81.0 OSTEOPOROSIS, UNSPECIFIED OSTEOPOROSIS TYPE, UNSPECIFIED PATHOLOGICAL FRACTURE PRESENCE: ICD-10-CM

## 2019-07-30 PROBLEM — D64.9 ANEMIA: Status: ACTIVE | Noted: 2019-07-30

## 2019-07-30 PROCEDURE — 99214 PR OFFICE/OUTPT VISIT, EST, LEVL IV, 30-39 MIN: ICD-10-PCS | Mod: S$GLB,,, | Performed by: INTERNAL MEDICINE

## 2019-07-30 PROCEDURE — 99214 OFFICE O/P EST MOD 30 MIN: CPT | Mod: S$GLB,,, | Performed by: INTERNAL MEDICINE

## 2019-07-30 RX ORDER — MECLIZINE HCL 12.5 MG 12.5 MG/1
12.5 TABLET ORAL 3 TIMES DAILY PRN
Qty: 21 TABLET | Refills: 3 | Status: SHIPPED | OUTPATIENT
Start: 2019-07-30

## 2019-07-30 RX ORDER — ALLOPURINOL 100 MG/1
100 TABLET ORAL 2 TIMES DAILY
Qty: 180 TABLET | Refills: 3 | Status: SHIPPED | OUTPATIENT
Start: 2019-07-30

## 2019-07-30 NOTE — PROGRESS NOTES
Subjective:       Patient ID: Ramona Patiño is a 74 y.o. female.    Chief Complaint: Follow-up (with labs)    HPI Gabapentin increased from 300 mg tid to 2 -300 mg tid dopne today. Has has Has and temple tenderness and  Loss of vision stable .sees more on the rt eye and non on the left side. Found with recent sed rate 45 five month ago with Pte on prednisone increased to 30 mg which is current dosis of 30 mg a day . Did not helped HAS and hurting on rt side of head. And SED RATE decreased to 23 . Nom acute gouty arthritis on Allopurinol 300 mg a day . Using cyclosporin for eye dryness  And sips more water for mouth dryness.Las hemoglobin 9.0. Continue on Calcium and vit D. Not taking any PIP        Current medications include:  Current Outpatient Medications on File Prior to Visit   Medication Sig Dispense Refill    allopurinol (ZYLOPRIM) 100 MG tablet Take 1 tablet (100 mg total) by mouth 2 (two) times daily. 180 tablet 3    atorvastatin (LIPITOR) 40 MG tablet Take 1 tablet by mouth once daily.      bisoprolol-hydrochlorothiazide 5-6.25 mg (ZIAC) 5-6.25 mg Tab Take 1 tablet by mouth once daily.      butalbital-acetaminophen-caff -40 mg Cap Take 2 tablets by mouth 2 (two) times daily as needed.      clopidogrel (PLAVIX) 75 mg tablet Take 1 tablet by mouth once daily.      CYANOCOBALAMIN, VITAMIN B-12, INJ Inject 1 mL into the muscle every 30 days.      cyclobenzaprine (FLEXERIL) 10 MG tablet Take 1 tablet by mouth 3 (three) times daily as needed.      cycloSPORINE (RESTASIS) 0.05 % ophthalmic emulsion Place 1 drop into both eyes 2 (two) times daily.      diclofenac sodium (VOLTAREN) 1 % Gel Apply topically 4 (four) times daily. 100 g 2    fluticasone (FLONASE) 50 mcg/actuation nasal spray 1 spray by Each Nare route daily as needed.      furosemide (LASIX) 40 MG tablet Take 1 tablet by mouth once daily.      gabapentin (NEURONTIN) 300 MG capsule Take 1 capsule (300 mg total) by mouth 3 (three)  times daily. 90 capsule 2    glipiZIDE (GLUCOTROL) 5 MG tablet Take 1 tablet by mouth 2 (two) times daily.      HYDROcodone-acetaminophen (NORCO) 5-325 mg per tablet Take 1 tablet by mouth 2 (two) times daily as needed.      lidocaine (LIDODERM) 5 % Place 1 patch onto the skin 2 (two) times daily as needed.      oxazepam (SERAX) 15 MG capsule Take 1 capsule by mouth 2 (two) times daily as needed.      pantoprazole (PROTONIX) 40 MG tablet Take 1 tablet by mouth once daily.      predniSONE (DELTASONE) 10 MG tablet Take 30 mg by mouth once daily.      promethazine (PHENERGAN) 25 MG tablet Take 1 tablet by mouth 2 (two) times daily as needed.      TRADJENTA 5 mg Tab tablet Take 1 tablet by mouth once daily.       No current facility-administered medications on file prior to visit.        Lab Results:  WBC   Date Value Ref Range Status   07/23/2019 14.21 (H) 4.3 - 10.8 X 10 3/ul Final     Hemoglobin   Date Value Ref Range Status   07/23/2019 9.0 (L) 12 - 16 g/dL Final     Hematocrit   Date Value Ref Range Status   07/23/2019 30.4 (L) 37 - 47 % Final     Color, UA   Date Value Ref Range Status   07/23/2019 YELLOW  Final     Ketones, UA   Date Value Ref Range Status   07/23/2019 NEGATIVE NEGATIVE mg/dL Final     Sodium   Date Value Ref Range Status   07/23/2019 141 136 - 145 mmol/L Final     Potassium   Date Value Ref Range Status   07/23/2019 4.6 3.5 - 5.1 mmol/L Final     Chloride   Date Value Ref Range Status   07/23/2019 97 (L) 98 - 107 mmol/L Final     CO2   Date Value Ref Range Status   07/23/2019 30 (H) 22 - 29 mmol/L Final     BUN, Bld   Date Value Ref Range Status   07/23/2019 17.5 8 - 23 mg/dL Final     Creatinine   Date Value Ref Range Status   07/23/2019 1.05 (H) 0.50 - 0.90 mg/dL Final        Review of Systems   Constitutional: Negative.    HENT:        Dryness in eyes and mouth on Restasis   Eyes: Negative.    Respiratory: Negative.    Cardiovascular: Negative.    Gastrointestinal: Negative.   "  Endocrine:        DM on tradjenta and glipizide`   Genitourinary: Negative.    Musculoskeletal: Positive for arthralgias and back pain.   Allergic/Immunologic: Positive for environmental allergies.   Neurological: Positive for numbness. Weakness: Hx of anemia         And tingling in legs   Hematological:        Hx of anemia on iron tabs   Psychiatric/Behavioral: Negative.          Objective:   /80 (BP Location: Left arm, Patient Position: Sitting, BP Method: Large (Manual))   Pulse 85   Temp 96.6 °F (35.9 °C) (Temporal)   Resp 16   Ht 5' 1" (1.549 m)   Wt 93 kg (205 lb)   BMI 38.73 kg/m²      Physical Exam   Constitutional: She is well-developed, well-nourished, and in no distress.   HENT:   DRYness  In eyes and mouth   Eyes: Conjunctivae and EOM are normal. Pupils are equal, round, and reactive to light.   Neck: Normal range of motion. Neck supple.   Cardiovascular: Normal rate and regular rhythm.    Pulmonary/Chest: Effort normal and breath sounds normal.   Abdominal: Soft. Bowel sounds are normal.   Neurological:   SLR=positive on left giovanni   Skin: Skin is warm.     Musculoskeletal:   [place] and Rosario nodes non tender           Assessment:       1. Giant cell arteritis    2. Gout, unspecified cause, unspecified chronicity, unspecified site    3. Sjogren's syndrome without extraglandular involvement    4. Anemia due to blood loss    5. Bursitis of shoulder, unspecified laterality    6. Fibrositis    7. Hyperuricemia    8. Primary osteoarthritis of knee, unspecified laterality    9. Osteoporosis, unspecified osteoporosis type, unspecified pathological fracture presence    10. Trochanteric bursitis of both hips    11. Abnormal creatinine clearance glomerular filtration    12. Central retinal artery occlusion, unspecified laterality    13. Lumbosacral radiculopathy    14. Cervical radiculopathy    15. Bilateral carpal tunnel syndrome            Plan:       No requiring corticosteroid injections " today in the neck

## 2019-09-23 ENCOUNTER — OFFICE VISIT (OUTPATIENT)
Dept: PHYSICAL MEDICINE AND REHAB | Facility: CLINIC | Age: 74
End: 2019-09-23
Payer: MEDICARE

## 2019-09-23 VITALS
RESPIRATION RATE: 16 BRPM | DIASTOLIC BLOOD PRESSURE: 72 MMHG | SYSTOLIC BLOOD PRESSURE: 168 MMHG | TEMPERATURE: 97 F | HEART RATE: 72 BPM | OXYGEN SATURATION: 96 % | BODY MASS INDEX: 40.02 KG/M2 | WEIGHT: 212 LBS | HEIGHT: 61 IN

## 2019-09-23 DIAGNOSIS — M70.61 TROCHANTERIC BURSITIS OF RIGHT HIP: ICD-10-CM

## 2019-09-23 DIAGNOSIS — M75.51 BILATERAL SHOULDER BURSITIS: ICD-10-CM

## 2019-09-23 DIAGNOSIS — M75.52 BILATERAL SHOULDER BURSITIS: ICD-10-CM

## 2019-09-23 DIAGNOSIS — M54.12 CERVICAL RADICULOPATHY: ICD-10-CM

## 2019-09-23 DIAGNOSIS — M54.17 LUMBOSACRAL RADICULOPATHY: ICD-10-CM

## 2019-09-23 DIAGNOSIS — E66.01 CLASS 3 SEVERE OBESITY DUE TO EXCESS CALORIES WITH SERIOUS COMORBIDITY AND BODY MASS INDEX (BMI) OF 40.0 TO 44.9 IN ADULT: ICD-10-CM

## 2019-09-23 DIAGNOSIS — M47.819 UNDIFFERENTIATED SPONDYLOARTHROPATHY: ICD-10-CM

## 2019-09-23 DIAGNOSIS — M47.16 LUMBAR SPONDYLOSIS WITH MYELOPATHY: Primary | ICD-10-CM

## 2019-09-23 DIAGNOSIS — G89.29 CHRONIC MIDLINE LOW BACK PAIN, WITH SCIATICA PRESENCE UNSPECIFIED: ICD-10-CM

## 2019-09-23 DIAGNOSIS — M54.5 CHRONIC MIDLINE LOW BACK PAIN, WITH SCIATICA PRESENCE UNSPECIFIED: ICD-10-CM

## 2019-09-23 PROCEDURE — 99213 OFFICE O/P EST LOW 20 MIN: CPT | Mod: S$GLB,,, | Performed by: NURSE PRACTITIONER

## 2019-09-23 PROCEDURE — 99213 PR OFFICE/OUTPT VISIT, EST, LEVL III, 20-29 MIN: ICD-10-PCS | Mod: S$GLB,,, | Performed by: NURSE PRACTITIONER

## 2019-09-23 RX ORDER — DICLOFENAC SODIUM 10 MG/G
GEL TOPICAL 4 TIMES DAILY
Qty: 100 G | Refills: 3 | Status: SHIPPED | OUTPATIENT
Start: 2019-09-23

## 2019-09-23 NOTE — PROGRESS NOTES
Subjective:       Patient ID: Ramona Patiño is a 74 y.o. female.    Chief Complaint: Follow-up    HPI        Joint & Soft Tissue Pain  Reported by patient.     Location: right (hip); deep; joint pain   Quality: aching; throbbing; deep; trend is not changing   Timing: frequent   Severity: moderate; pain level 6/10  Duration: 2 years   Context: bending; overuse   Alleviating Factors: position change; rest   Aggravating Factors: sitting; standing; ROM  Associated Symptoms: no swelling; no redness; no warmth; no ecchymosis; no catching/locking; no buckling; no grinding; weakness; numbness; tingling; popping/clicking     Lower Back  Reported by patient.     Location: bilateral; midline   Quality: aching; throbbing; sharp; deep   Severity: moderate; pain level 7/10   Duration: date of onset: (2013)   Timing: chronic; recurrent   Context: overuse   Alleviating Factors: rest   Aggravating Factors: sitting; standing; lying down; ROM; weightbearing; exercise   Associated Symptoms: no weakness; no numbness; no tingling; no swelling; no redness; no warmth; no ecchymosis; no catching/locking; no grinding; no instability; radiation down leg (intermittent)   Previous Surgery: none   Prior Imaging: x ray; MRI   Previous Injections: none   Previous PT: helped a little   Work Related: no   Working: no     Neck  Reported by patient.     Location: midline; posterior   Quality: throbbing; dull   Severity: moderate; pain level 6/10   Duration: date of onset: (2013); days; months; years   Timing: chronic; recurrent   Context: overuse   Alleviating Factors: nothing helps   Aggravating Factors: ROM   Associated Symptoms: no weakness; no numbness; no tingling; no swelling; no redness; no warmth; no ecchymosis; no catching/locking; no popping/clicking; no buckling; no grinding   Previous Surgery: none   Prior Imaging: x ray; CT scan   Previous Injections: none   Previous PT: helped a little   Work Related: no   Working: no  She has PMH HTN,  HLD, degenerative joint disease, chronic migraines, central retinal artery occlusion, PVD, GCA, osteoporosis, anemia, and Sjogren's sx. Imaging validates spondylitis, cervical radiculopathy, trochanteric bursistis, and lumbosacral radiculopathy. She also has carpal tunnel paresthesia. Today she c/o chronic lower back pain, right hip, and neck pain. Physical therapy in 2013, but she states PT was ineffective. No instability issues in right hip. No grinding, popping, clicking, or locking.    Review of Systems   Constitutional: Negative for activity change, appetite change, chills and fever.   HENT: Negative for congestion, hearing loss, nosebleeds and trouble swallowing.    Respiratory: Negative for cough, chest tightness, shortness of breath and wheezing.    Cardiovascular: Negative for chest pain and palpitations.   Gastrointestinal: Negative for abdominal distention, abdominal pain, constipation, diarrhea, nausea and vomiting.   Genitourinary: Negative for difficulty urinating, flank pain, frequency, hematuria and urgency.   Musculoskeletal: Positive for arthralgias, back pain, gait problem, myalgias and neck pain. Negative for joint swelling and neck stiffness.   Skin: Negative for color change and pallor.   Neurological: Positive for numbness. Negative for dizziness, seizures, syncope, weakness, light-headedness and headaches.   Hematological: Negative for adenopathy. Does not bruise/bleed easily.   Psychiatric/Behavioral: Negative for agitation, behavioral problems, confusion and sleep disturbance. The patient is not nervous/anxious.        Objective:      Physical Exam   Constitutional: She is oriented to person, place, and time. She appears well-developed and well-nourished.   HENT:   Head: Normocephalic and atraumatic.   Nose: Mucosal edema, rhinorrhea and sinus tenderness present. Right sinus exhibits maxillary sinus tenderness. Right sinus exhibits no frontal sinus tenderness. Left sinus exhibits maxillary  sinus tenderness. Left sinus exhibits no frontal sinus tenderness.   Mouth/Throat: Mucous membranes are normal. Posterior oropharyngeal edema and posterior oropharyngeal erythema present. No oropharyngeal exudate or tonsillar abscesses.   Eyes: Pupils are equal, round, and reactive to light. Conjunctivae and EOM are normal. No scleral icterus.   Neck: Trachea normal and normal range of motion. Neck supple. Carotid bruit is not present. No thyroid mass present.   Cardiovascular: Normal rate, regular rhythm and normal heart sounds.   Pulmonary/Chest: Effort normal and breath sounds normal. No apnea and no tachypnea. No respiratory distress.   Abdominal: Soft. Bowel sounds are normal.   Musculoskeletal: Normal range of motion. She exhibits tenderness. She exhibits no edema.   Diffuse lumbar tenderness     Neurological: She is alert and oriented to person, place, and time.   Skin: Skin is warm and dry.   Psychiatric: She has a normal mood and affect. Her behavior is normal. Judgment normal.       Assessment:       1. Lumbar spondylosis with myelopathy    2. Lumbosacral radiculopathy    3. Undifferentiated spondyloarthropathy    4. Cervical radiculopathy    5. Bilateral shoulder bursitis    6. Trochanteric bursitis of right hip    7. Chronic midline low back pain, with sciatica presence unspecified    8. Class 3 severe obesity due to excess calories with serious comorbidity and body mass index (BMI) of 40.0 to 44.9 in adult        Plan:       PROBLEM LIST     Ramona was seen today for follow-up.    Diagnoses and all orders for this visit:    Lumbar spondylosis with myelopathy    Lumbosacral radiculopathy    Undifferentiated spondyloarthropathy    Cervical radiculopathy    Bilateral shoulder bursitis    Trochanteric bursitis of right hip    Chronic midline low back pain, with sciatica presence unspecified    Class 3 severe obesity due to excess calories with serious comorbidity and body mass index (BMI) of 40.0 to 44.9 in  adult     pain well controlled w/ current regimen. Will continue her multimodal therapy of opioid w/ cymbalta and diclofenac gel

## 2019-09-25 LAB
ABS NRBC COUNT: 0.02 X 10 3/UL (ref 0–0.01)
ABSOLUTE BASOPHIL: 0.04 X 10 3/UL (ref 0–0.22)
ABSOLUTE EOSINOPHIL: 0.07 X 10 3/UL (ref 0.04–0.54)
ABSOLUTE IMMATURE GRAN: 0.27 X 10 3/UL (ref 0–0.04)
ABSOLUTE LYMPHOCYTE: 1.82 X 10 3/UL (ref 0.86–4.75)
ABSOLUTE MONOCYTE: 0.75 X 10 3/UL (ref 0.22–1.08)
ALBUMIN SERPL-MCNC: 3.8 G/DL (ref 3.5–5.2)
ALBUMIN/GLOB SERPL ELPH: 1.5 {RATIO} (ref 1–2.7)
ALP ISOS SERPL LEV INH-CCNC: 141 IU/L (ref 35–105)
ALT (SGPT): 14 U/L (ref 0–33)
AMORPH URATE CRY URNS QL MICRO: NEGATIVE
ANION GAP SERPL CALC-SCNC: 8 MMOL/L (ref 8–17)
AST SERPL-CCNC: 15 U/L (ref 0–32)
BACTERIA #/AREA URNS HPF: ABNORMAL /[HPF]
BASOPHILS NFR BLD: 0.3 % (ref 0.2–1.2)
BILIRUB UR QL STRIP: NEGATIVE
BILIRUBIN, TOTAL: <0.15 MG/DL (ref 0–1.2)
BUN/CREAT SERPL: 13.5 (ref 6–20)
CALCIUM SERPL-MCNC: 10 MG/DL (ref 8.6–10.2)
CARBON DIOXIDE, CO2: 27 MMOL/L (ref 22–29)
CHLORIDE: 104 MMOL/L (ref 98–107)
CLARITY UR: ABNORMAL
COLOR UR: YELLOW
CREAT SERPL-MCNC: 1.06 MG/DL (ref 0.5–0.9)
EOSINOPHIL NFR BLD: 0.5 % (ref 0.7–7)
EPITHELIAL CELLS: ABNORMAL
GFR ESTIMATION: 50.4
GLOBULIN: 2.5 G/DL (ref 1.5–4.5)
GLUCOSE (UA): NEGATIVE MG/DL
GLUCOSE: 151 MG/DL (ref 82–115)
HCT VFR BLD AUTO: 30.3 % (ref 37–47)
HGB BLD-MCNC: 9.1 G/DL (ref 12–16)
IMMATURE GRANULOCYTES: 2 % (ref 0–0.5)
KETONES UR QL STRIP: NEGATIVE MG/DL
LEUKOCYTE ESTERASE UR QL STRIP: ABNORMAL
LYMPHOCYTES NFR BLD: 13.4 % (ref 19.3–53.1)
MCH RBC QN AUTO: 28.6 PG (ref 27–32)
MCHC RBC AUTO-ENTMCNC: 30 G/DL (ref 32–36)
MCV RBC AUTO: 95.3 FL (ref 82–100)
MONOCYTES NFR BLD: 5.5 % (ref 4.7–12.5)
MUCOUS THREADS URNS QL MICRO: NEGATIVE
NEUTROPHILS ABSOLUTE COUNT: 10.66 X 10 3/UL (ref 2.15–7.56)
NEUTROPHILS NFR BLD: 78.3 %
NITRITE UR QL STRIP: POSITIVE
NUCLEATED RED BLOOD CELLS: 0.1 /100 WBC (ref 0–0.2)
OCCULT BLOOD: ABNORMAL
PH, URINE: 7 (ref 5–7.5)
PLATELET # BLD AUTO: 275 X 10 3/UL (ref 135–400)
POTASSIUM: 5.2 MMOL/L (ref 3.5–5.1)
PROT SNV-MCNC: 6.3 G/DL (ref 6.4–8.3)
PROT UR QL STRIP: 15 MG/DL
RBC # BLD AUTO: 3.18 X 10 6/UL (ref 4.2–5.4)
RBC/HPF: ABNORMAL
RDW-SD: 62.2 FL (ref 37–54)
SED RATE (WESTERGREN): 11 MM/HR (ref 0–30)
SODIUM: 139 MMOL/L (ref 136–145)
SP GR UR STRIP: 1.01 (ref 1–1.03)
UREA NITROGEN (BUN): 14.3 MG/DL (ref 8–23)
URINE CULTURE, ROUTINE: NORMAL
UROBILINOGEN, URINE: NORMAL E.U./DL (ref 0–1)
WBC # BLD: 13.61 X 10 3/UL (ref 4.3–10.8)
WBC/HPF: ABNORMAL

## 2019-10-30 ENCOUNTER — TELEPHONE (OUTPATIENT)
Dept: RHEUMATOLOGY | Facility: CLINIC | Age: 74
End: 2019-10-30

## 2019-10-30 NOTE — TELEPHONE ENCOUNTER
----- Message from Jarret Alexander MD sent at 9/25/2019  1:40 PM CDT -----  Call her has UTI and if not allergic to ampicillin call Augmentin 500 mg tid for 7 days -sig=1 tab tid for 7 days- #21- 1 refill . Also she is anemic 9.1 and see PCP ASAP